# Patient Record
Sex: FEMALE | Race: BLACK OR AFRICAN AMERICAN | URBAN - METROPOLITAN AREA
[De-identification: names, ages, dates, MRNs, and addresses within clinical notes are randomized per-mention and may not be internally consistent; named-entity substitution may affect disease eponyms.]

---

## 2018-03-12 ENCOUNTER — HOSPITAL ENCOUNTER (EMERGENCY)
Age: 58
Discharge: STILL ADMITTED OR ADMINISTRATIVE | End: 2018-03-12
Attending: EMERGENCY MEDICINE

## 2023-10-27 ENCOUNTER — OFFICE VISIT (OUTPATIENT)
Age: 63
End: 2023-10-27

## 2023-10-27 VITALS
BODY MASS INDEX: 33.15 KG/M2 | HEIGHT: 65 IN | DIASTOLIC BLOOD PRESSURE: 83 MMHG | SYSTOLIC BLOOD PRESSURE: 180 MMHG | WEIGHT: 199 LBS

## 2023-10-27 DIAGNOSIS — Z01.419 WELL WOMAN EXAM WITH ROUTINE GYNECOLOGICAL EXAM: Primary | ICD-10-CM

## 2023-10-27 DIAGNOSIS — Z12.31 BREAST CANCER SCREENING BY MAMMOGRAM: ICD-10-CM

## 2023-10-27 DIAGNOSIS — E28.39 ESTROGEN DEFICIENCY: ICD-10-CM

## 2023-10-27 PROBLEM — E11.9 TYPE 2 DIABETES MELLITUS WITHOUT COMPLICATIONS (HCC): Status: ACTIVE | Noted: 2023-10-27

## 2023-10-27 PROBLEM — I10 ESSENTIAL HYPERTENSION: Status: ACTIVE | Noted: 2023-10-27

## 2023-10-27 RX ORDER — PRAVASTATIN SODIUM 20 MG
20 TABLET ORAL
COMMUNITY
Start: 2013-05-30

## 2023-10-27 RX ORDER — BENAZEPRIL HYDROCHLORIDE 40 MG/1
40 TABLET, FILM COATED ORAL
COMMUNITY
Start: 2021-01-05

## 2023-10-27 RX ORDER — METFORMIN HYDROCHLORIDE 500 MG/1
1000 TABLET, EXTENDED RELEASE ORAL
COMMUNITY
Start: 2021-01-05

## 2023-10-27 RX ORDER — BISOPROLOL FUMARATE AND HYDROCHLOROTHIAZIDE 5; 6.25 MG/1; MG/1
TABLET ORAL
COMMUNITY
Start: 2021-01-05

## 2023-10-27 RX ORDER — MIRTAZAPINE 15 MG/1
TABLET, FILM COATED ORAL
COMMUNITY
Start: 2023-10-18

## 2023-10-27 NOTE — PROGRESS NOTES
Darrell Huerta is a 70 y.o. female returns for an annual exam     Chief Complaint   Patient presents with    Annual Exam       No LMP recorded. Patient is postmenopausal.  Her periods are absent in flow. Problems: no problems  Birth Control: post menopausal status. Last Pap: normal obtained 2021 per patient. She does not have a history of CINDY 2, 3 or cervical cancer. Last Mammogram: has not had a recent mammogram.    Last Bone Density:  not obtained. Last colonoscopy: normal obtained over 10 year(s) ago. 1. Have you been to the ER, urgent care clinic, or hospitalized since your last visit? 9/2023 [place] palpitations. 2. Have you seen or consulted any other health care providers outside of the [address] since your last visit? No    Examination chaperoned by Kathy Kincaid CMA.
previous visit (from the past 12 hour(s)). Assessment:   Diagnosis Orders   1. Well woman exam with routine gynecological exam  PAP IG, Aptima HPV and rfx 16/18,45 ([id number])      2. Estrogen deficiency  DEXA BONE DENSITY AXIAL SKELETON      3.  Breast cancer screening by mammogram  JUVENAL Screening Bilateral          Plan:  Counseled re: diet, exercise, healthy lifestyle  Rec annual mammogram  Return in about 2 years (around 10/27/2025) for Annual.   Data Unavailable